# Patient Record
Sex: FEMALE | Race: WHITE | NOT HISPANIC OR LATINO | Employment: FULL TIME | URBAN - NONMETROPOLITAN AREA
[De-identification: names, ages, dates, MRNs, and addresses within clinical notes are randomized per-mention and may not be internally consistent; named-entity substitution may affect disease eponyms.]

---
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
## 2022-11-23 ENCOUNTER — HOSPITAL ENCOUNTER (OUTPATIENT)
Facility: CLINIC | Age: 22
Setting detail: OBSERVATION
Discharge: HOME | End: 2022-11-24
Attending: NURSE PRACTITIONER | Admitting: SURGERY
Payer: COMMERCIAL

## 2022-11-23 ENCOUNTER — APPOINTMENT (OUTPATIENT)
Dept: CT IMAGING | Facility: CLINIC | Age: 22
End: 2022-11-23
Attending: NURSE PRACTITIONER
Payer: COMMERCIAL

## 2022-11-23 DIAGNOSIS — K35.30 ACUTE APPENDICITIS WITH LOCALIZED PERITONITIS, WITHOUT PERFORATION, ABSCESS, OR GANGRENE: ICD-10-CM

## 2022-11-23 PROBLEM — B36.0 TINEA VERSICOLOR: Status: ACTIVE | Noted: 2022-10-20

## 2022-11-23 LAB
ALBUMIN SERPL-MCNC: 4.7 G/DL (ref 3.4–5)
ALBUMIN UR-MCNC: NEGATIVE MG/DL
ALP SERPL-CCNC: 59 U/L (ref 40–150)
ALT SERPL W P-5'-P-CCNC: 20 U/L (ref 0–50)
ANION GAP SERPL CALCULATED.3IONS-SCNC: 7 MMOL/L (ref 3–14)
APPEARANCE UR: CLEAR
AST SERPL W P-5'-P-CCNC: 12 U/L (ref 0–45)
BILIRUB SERPL-MCNC: 1.6 MG/DL (ref 0.2–1.3)
BILIRUB UR QL STRIP: NEGATIVE
BUN SERPL-MCNC: 8 MG/DL (ref 7–30)
CALCIUM SERPL-MCNC: 10.1 MG/DL (ref 8.5–10.1)
CHLORIDE BLD-SCNC: 102 MMOL/L (ref 94–109)
CO2 SERPL-SCNC: 27 MMOL/L (ref 20–32)
COLOR UR AUTO: ABNORMAL
CREAT SERPL-MCNC: 0.74 MG/DL (ref 0.52–1.04)
ERYTHROCYTE [DISTWIDTH] IN BLOOD BY AUTOMATED COUNT: 14.3 % (ref 10–15)
GFR SERPL CREATININE-BSD FRML MDRD: >90 ML/MIN/1.73M2
GLUCOSE BLD-MCNC: 92 MG/DL (ref 70–99)
GLUCOSE UR STRIP-MCNC: NEGATIVE MG/DL
HCG SERPL QL: NEGATIVE
HCT VFR BLD AUTO: 38.5 % (ref 35–47)
HGB BLD-MCNC: 13.6 G/DL (ref 11.7–15.7)
HGB UR QL STRIP: ABNORMAL
KETONES UR STRIP-MCNC: 20 MG/DL
LEUKOCYTE ESTERASE UR QL STRIP: NEGATIVE
MCH RBC QN AUTO: 31.1 PG (ref 26.5–33)
MCHC RBC AUTO-ENTMCNC: 35.3 G/DL (ref 31.5–36.5)
MCV RBC AUTO: 88 FL (ref 78–100)
NITRATE UR QL: NEGATIVE
PH UR STRIP: 6 [PH] (ref 5–7)
PLATELET # BLD AUTO: 191 10E3/UL (ref 150–450)
POTASSIUM BLD-SCNC: 3.6 MMOL/L (ref 3.4–5.3)
PROT SERPL-MCNC: 8.3 G/DL (ref 6.8–8.8)
RBC # BLD AUTO: 4.38 10E6/UL (ref 3.8–5.2)
RBC URINE: 0 /HPF
SODIUM SERPL-SCNC: 136 MMOL/L (ref 133–144)
SP GR UR STRIP: 1 (ref 1–1.03)
SQUAMOUS EPITHELIAL: <1 /HPF
UROBILINOGEN UR STRIP-MCNC: NORMAL MG/DL
WBC # BLD AUTO: 14 10E3/UL (ref 4–11)
WBC URINE: <1 /HPF

## 2022-11-23 PROCEDURE — 250N000009 HC RX 250: Performed by: NURSE PRACTITIONER

## 2022-11-23 PROCEDURE — 99285 EMERGENCY DEPT VISIT HI MDM: CPT | Mod: 25

## 2022-11-23 PROCEDURE — 96365 THER/PROPH/DIAG IV INF INIT: CPT

## 2022-11-23 PROCEDURE — 250N000011 HC RX IP 250 OP 636: Performed by: NURSE PRACTITIONER

## 2022-11-23 PROCEDURE — 84132 ASSAY OF SERUM POTASSIUM: CPT | Performed by: EMERGENCY MEDICINE

## 2022-11-23 PROCEDURE — 74177 CT ABD & PELVIS W/CONTRAST: CPT

## 2022-11-23 PROCEDURE — 36415 COLL VENOUS BLD VENIPUNCTURE: CPT | Performed by: EMERGENCY MEDICINE

## 2022-11-23 PROCEDURE — 82947 ASSAY GLUCOSE BLOOD QUANT: CPT | Performed by: EMERGENCY MEDICINE

## 2022-11-23 PROCEDURE — 85027 COMPLETE CBC AUTOMATED: CPT | Performed by: EMERGENCY MEDICINE

## 2022-11-23 PROCEDURE — 96375 TX/PRO/DX INJ NEW DRUG ADDON: CPT

## 2022-11-23 PROCEDURE — 84703 CHORIONIC GONADOTROPIN ASSAY: CPT | Performed by: EMERGENCY MEDICINE

## 2022-11-23 PROCEDURE — 96360 HYDRATION IV INFUSION INIT: CPT | Mod: 59

## 2022-11-23 PROCEDURE — 96361 HYDRATE IV INFUSION ADD-ON: CPT

## 2022-11-23 PROCEDURE — G0378 HOSPITAL OBSERVATION PER HR: HCPCS

## 2022-11-23 PROCEDURE — 258N000003 HC RX IP 258 OP 636: Performed by: NURSE PRACTITIONER

## 2022-11-23 PROCEDURE — 258N000003 HC RX IP 258 OP 636: Performed by: SURGERY

## 2022-11-23 PROCEDURE — C9803 HOPD COVID-19 SPEC COLLECT: HCPCS

## 2022-11-23 PROCEDURE — 250N000011 HC RX IP 250 OP 636: Performed by: SURGERY

## 2022-11-23 PROCEDURE — 81001 URINALYSIS AUTO W/SCOPE: CPT | Performed by: NURSE PRACTITIONER

## 2022-11-23 PROCEDURE — 96376 TX/PRO/DX INJ SAME DRUG ADON: CPT

## 2022-11-23 RX ORDER — HYDROMORPHONE HYDROCHLORIDE 1 MG/ML
.2-.5 INJECTION, SOLUTION INTRAMUSCULAR; INTRAVENOUS; SUBCUTANEOUS
Status: DISCONTINUED | OUTPATIENT
Start: 2022-11-23 | End: 2022-11-24 | Stop reason: HOSPADM

## 2022-11-23 RX ORDER — LIDOCAINE 40 MG/G
CREAM TOPICAL
Status: DISCONTINUED | OUTPATIENT
Start: 2022-11-23 | End: 2022-11-24 | Stop reason: HOSPADM

## 2022-11-23 RX ORDER — PROCHLORPERAZINE MALEATE 10 MG
10 TABLET ORAL EVERY 8 HOURS PRN
Status: DISCONTINUED | OUTPATIENT
Start: 2022-11-23 | End: 2022-11-24 | Stop reason: HOSPADM

## 2022-11-23 RX ORDER — HYDROMORPHONE HYDROCHLORIDE 1 MG/ML
0.5 INJECTION, SOLUTION INTRAMUSCULAR; INTRAVENOUS; SUBCUTANEOUS ONCE
Status: COMPLETED | OUTPATIENT
Start: 2022-11-23 | End: 2022-11-23

## 2022-11-23 RX ORDER — SODIUM CHLORIDE, SODIUM LACTATE, POTASSIUM CHLORIDE, CALCIUM CHLORIDE 600; 310; 30; 20 MG/100ML; MG/100ML; MG/100ML; MG/100ML
INJECTION, SOLUTION INTRAVENOUS CONTINUOUS
Status: DISCONTINUED | OUTPATIENT
Start: 2022-11-23 | End: 2022-11-24 | Stop reason: HOSPADM

## 2022-11-23 RX ORDER — ALBUTEROL SULFATE 90 UG/1
AEROSOL, METERED RESPIRATORY (INHALATION)
COMMUNITY
Start: 2022-09-15 | End: 2022-11-23

## 2022-11-23 RX ORDER — IOPAMIDOL 755 MG/ML
65 INJECTION, SOLUTION INTRAVASCULAR ONCE
Status: COMPLETED | OUTPATIENT
Start: 2022-11-23 | End: 2022-11-23

## 2022-11-23 RX ORDER — PIPERACILLIN SODIUM, TAZOBACTAM SODIUM 3; .375 G/15ML; G/15ML
3.38 INJECTION, POWDER, LYOPHILIZED, FOR SOLUTION INTRAVENOUS EVERY 6 HOURS
Status: DISCONTINUED | OUTPATIENT
Start: 2022-11-23 | End: 2022-11-24 | Stop reason: HOSPADM

## 2022-11-23 RX ORDER — ONDANSETRON 2 MG/ML
4 INJECTION INTRAMUSCULAR; INTRAVENOUS ONCE
Status: COMPLETED | OUTPATIENT
Start: 2022-11-23 | End: 2022-11-23

## 2022-11-23 RX ORDER — ONDANSETRON 2 MG/ML
4 INJECTION INTRAMUSCULAR; INTRAVENOUS EVERY 6 HOURS PRN
Status: DISCONTINUED | OUTPATIENT
Start: 2022-11-23 | End: 2022-11-24 | Stop reason: HOSPADM

## 2022-11-23 RX ORDER — ONDANSETRON 4 MG/1
4 TABLET, ORALLY DISINTEGRATING ORAL EVERY 6 HOURS PRN
Status: DISCONTINUED | OUTPATIENT
Start: 2022-11-23 | End: 2022-11-24 | Stop reason: HOSPADM

## 2022-11-23 RX ORDER — PIPERACILLIN SODIUM, TAZOBACTAM SODIUM 3; .375 G/15ML; G/15ML
3.38 INJECTION, POWDER, LYOPHILIZED, FOR SOLUTION INTRAVENOUS ONCE
Status: DISCONTINUED | OUTPATIENT
Start: 2022-11-23 | End: 2022-11-23

## 2022-11-23 RX ADMIN — HYDROMORPHONE HYDROCHLORIDE 0.5 MG: 1 INJECTION, SOLUTION INTRAMUSCULAR; INTRAVENOUS; SUBCUTANEOUS at 18:09

## 2022-11-23 RX ADMIN — SODIUM CHLORIDE, POTASSIUM CHLORIDE, SODIUM LACTATE AND CALCIUM CHLORIDE: 600; 310; 30; 20 INJECTION, SOLUTION INTRAVENOUS at 22:16

## 2022-11-23 RX ADMIN — SODIUM CHLORIDE 60 ML: 9 INJECTION, SOLUTION INTRAVENOUS at 17:16

## 2022-11-23 RX ADMIN — ONDANSETRON 4 MG: 2 INJECTION INTRAMUSCULAR; INTRAVENOUS at 18:08

## 2022-11-23 RX ADMIN — HYDROMORPHONE HYDROCHLORIDE 0.5 MG: 1 INJECTION, SOLUTION INTRAMUSCULAR; INTRAVENOUS; SUBCUTANEOUS at 21:00

## 2022-11-23 RX ADMIN — IOPAMIDOL 65 ML: 755 INJECTION, SOLUTION INTRAVENOUS at 17:16

## 2022-11-23 RX ADMIN — ONDANSETRON 4 MG: 2 INJECTION INTRAMUSCULAR; INTRAVENOUS at 18:12

## 2022-11-23 RX ADMIN — PROCHLORPERAZINE EDISYLATE 10 MG: 5 INJECTION INTRAMUSCULAR; INTRAVENOUS at 23:03

## 2022-11-23 RX ADMIN — PIPERACILLIN AND TAZOBACTAM 3.38 G: 3; .375 INJECTION, POWDER, FOR SOLUTION INTRAVENOUS at 18:26

## 2022-11-23 RX ADMIN — SODIUM CHLORIDE 1000 ML: 9 INJECTION, SOLUTION INTRAVENOUS at 17:01

## 2022-11-23 ASSESSMENT — ENCOUNTER SYMPTOMS
DYSURIA: 0
FEVER: 0
COUGH: 0
NAUSEA: 0
DIARRHEA: 1
CONFUSION: 0
VOMITING: 0
SHORTNESS OF BREATH: 0
HEADACHES: 0
BLOOD IN STOOL: 0
DIZZINESS: 0
ABDOMINAL PAIN: 1
MYALGIAS: 0
ARTHRALGIAS: 0
NECK STIFFNESS: 0
CONSTIPATION: 0
FATIGUE: 0
BACK PAIN: 1

## 2022-11-23 ASSESSMENT — ACTIVITIES OF DAILY LIVING (ADL)
ADLS_ACUITY_SCORE: 35
ADLS_ACUITY_SCORE: 35
ADLS_ACUITY_SCORE: 28
ADLS_ACUITY_SCORE: 35

## 2022-11-23 NOTE — ED TRIAGE NOTES
Abdominal pain since Sunday, felt similar to constipation but been moving bowels. Sent to ED after seen at  to rule out appendicitis.      Triage Assessment     Row Name 11/23/22 8443       Triage Assessment (Adult)    Airway WDL WDL       Skin Circulation/Temperature WDL    Skin Circulation/Temperature WDL WDL       Cardiac WDL    Cardiac WDL WDL       Peripheral/Neurovascular WDL    Peripheral Neurovascular WDL WDL       Cognitive/Neuro/Behavioral WDL    Cognitive/Neuro/Behavioral WDL WDL

## 2022-11-23 NOTE — LETTER
[facility] OR  [address] [place] [address]  [phone]          November 24, 2022    RE:  Meliza Lala                                                                                                                                                       [address]  [facility] [address]            To whom it may concern:    Meliza Lala is under my professional care following surgery on 11/24/2022. She will be unable to work for two weeks after surgery. She should not lift >15lbs for 4 weeks after surgery. Please call with any questions or concerns.     Sincerely,        Kendra Hadley MD  [facility]  [phone]

## 2022-11-23 NOTE — ED PROVIDER NOTES
History     Chief Complaint:  Abdominal Pain       HPI   Meliza Lala is a 21 year old female, with history of IBS, who presents with abdominal pain.  Patient notes onset of lower abdominal pain right greater than left 3 days ago.  She has associated bloating and decreased appetite.  Patient states she feels as if she is constipated but is having diarrhea 2 times a day.  She denies emesis, hematochezia, melena.  The patient denies dysuria.  She has associated low back pain.  She denies pelvic pain, vaginal discharge.  She last ate a protein shake at 9 AM.  Of note, she was diagnosed with Covid 19 ten days ago and is no longer febrile and has no cough.  Of note, the patient had a colonoscopy and endoscopy 1 year ago in [place] and was diagnosed with irritable bowel syndrome at that time.  She is not on any medications for IBS.    ROS:  Review of Systems   Constitutional: Negative for fatigue and fever.   HENT: Negative for congestion.    Respiratory: Negative for cough and shortness of breath.    Cardiovascular: Negative for chest pain.   Gastrointestinal: Positive for abdominal pain and diarrhea. Negative for blood in stool, constipation, nausea and vomiting.   Genitourinary: Negative for dysuria, pelvic pain and vaginal discharge.   Musculoskeletal: Positive for back pain. Negative for arthralgias, myalgias and neck stiffness.   Skin: Negative for rash.   Neurological: Negative for dizziness, syncope and headaches.   Psychiatric/Behavioral: Negative for confusion.   All other systems reviewed and are negative.    Allergies:  No Known Allergies     Medications:    No current outpatient medications on file.    Past Medical History:    No past medical history on file.    Past Surgical History:    No past surgical history on file.     Family History:    family history includes Other - See Comments in her mother.    Social History:     PCP: No Ref-Primary, Physician     Physical Exam     Patient Vitals for the past  "24 hrs:   BP Temp Temp src Pulse Resp SpO2 Height Weight   11/23/22 1433 121/77 97.9  F (36.6  C) Temporal 89 16 99 % 1.6 m (5' 3\") 59 kg (130 lb)        Physical Exam  Nursing notes reviewed. Vitals reviewed.  General: Alert. Well kept.  Eyes:  Conjunctiva non-injected, non-icteric.  Neck/Throat: Moist mucous membranes.  Normal voice.  Cardiac: Regular rhythm. Normal heart sounds with no murmur/rubs/click.   Pulmonary: Clear and equal breath sounds bilaterally. No crackles/rales. No wheezing  Abdomen: Soft. Non-distended.  Bilateral lower abdominal pain right greater than left.  Mild right CVA tenderness.  No masses. No guarding or rebound.  Musculoskeletal: Normal gross range of motion of all 4 extremities.    Neurological: Alert and oriented x4.   Skin: Warm and dry without rashes or petechiae. Normal appearance of visualized exposed skin.  Psych: Affect normal. Good eye contact.      Emergency Department Course   Imaging:  CT Abdomen Pelvis w Contrast   Preliminary Result   IMPRESSION: Severe acute appendicitis. Prominent enlargement of the appendix measuring 2.3 cm with inflammatory edema noted. No abscess at this time.       Report per radiology    Laboratory:  Labs Ordered and Resulted from Time of ED Arrival to Time of ED Departure   CBC WITH PLATELETS - Abnormal       Result Value    WBC Count 14.0 (*)     RBC Count 4.38      Hemoglobin 13.6      Hematocrit 38.5      MCV 88      MCH 31.1      MCHC 35.3      RDW 14.3      Platelet Count 191     COMPREHENSIVE METABOLIC PANEL - Abnormal    Sodium 136      Potassium 3.6      Chloride 102      Carbon Dioxide (CO2) 27      Anion Gap 7      Urea Nitrogen 8      Creatinine 0.74      Calcium 10.1      Glucose 92      Alkaline Phosphatase 59      AST 12      ALT 20      Protein Total 8.3      Albumin 4.7      Bilirubin Total 1.6 (*)     GFR Estimate >90     ROUTINE UA WITH MICROSCOPIC REFLEX TO CULTURE - Abnormal    Color Urine Straw      Appearance Urine Clear      " Glucose Urine Negative      Bilirubin Urine Negative      Ketones Urine 20 (*)     Specific Gravity Urine 1.005      Blood Urine Small (*)     pH Urine 6.0      Protein Albumin Urine Negative      Urobilinogen Urine Normal      Nitrite Urine Negative      Leukocyte Esterase Urine Negative      RBC Urine 0      WBC Urine <1      Squamous Epithelials Urine <1     HCG QUALITATIVE PREGNANCY - Normal    hCG Serum Qualitative Negative     COVID-19 VIRUS (CORONAVIRUS) BY PCR     Emergency Department Course:  Reviewed:  I reviewed nursing notes, vitals, past medical history and Care Everywhere    Assessments:  1633 I obtained history and examined the patient as noted above.   1745 I rechecked the patient and explained findings.     Consults:   1750 I spoke with Dr. Aguilar, general surgery, who accepts patient for admission.    Interventions:  Medications   0.9% sodium chloride BOLUS (1,000 mLs Intravenous New Bag 11/23/22 1701)   HYDROmorphone (PF) (DILAUDID) injection 0.5 mg (has no administration in time range)   piperacillin-tazobactam (ZOSYN) 3.375 g vial to attach to  mL bag (has no administration in time range)   iopamidol (ISOVUE-370) solution 65 mL (65 mLs Intravenous Given 11/23/22 1716)   Saline Flush (60 mLs Intravenous Given 11/23/22 1716)      Disposition:  The patient was admitted to the hospital under the care of Dr. Negrito Aguilar.     Impression & Plan    Medical Decision Making:  Meliza Lala is a 21 year old female who presents with abdominal pain concerning for appendicitis. CT scan confirms the presence of appendicitis, and there is no evidence of rupture or abscess at this time. The patient s symptoms have been controlled with interventions in the Emergency Department, and she appears more comfortable on recheck. I discussed the diagnosis with the patient and have answered all questions about the plan of care. Zosyn antibiotics have been ordered .  I discussed the patient with the on call  general surgeon, Dr. Negrito Aguilar, and the patient will be admitted to observation for surgery in the morning. she has remained hemodynamically stable in the Emergency Department.    Diagnosis:    ICD-10-CM    1. Acute appendicitis with localized peritonitis, without perforation, abscess, or gangrene  K35.30          11/23/2022   Jory Mariano, Jory Abdalla, CNP  11/23/22 1753

## 2022-11-24 ENCOUNTER — ANESTHESIA EVENT (OUTPATIENT)
Dept: SURGERY | Facility: CLINIC | Age: 22
End: 2022-11-24
Payer: COMMERCIAL

## 2022-11-24 ENCOUNTER — ANESTHESIA (OUTPATIENT)
Dept: SURGERY | Facility: CLINIC | Age: 22
End: 2022-11-24
Payer: COMMERCIAL

## 2022-11-24 VITALS
HEART RATE: 70 BPM | RESPIRATION RATE: 18 BRPM | HEIGHT: 63 IN | WEIGHT: 130 LBS | SYSTOLIC BLOOD PRESSURE: 128 MMHG | DIASTOLIC BLOOD PRESSURE: 86 MMHG | BODY MASS INDEX: 23.04 KG/M2 | TEMPERATURE: 97.6 F | OXYGEN SATURATION: 98 %

## 2022-11-24 LAB — GLUCOSE BLDC GLUCOMTR-MCNC: 79 MG/DL (ref 70–99)

## 2022-11-24 PROCEDURE — 82962 GLUCOSE BLOOD TEST: CPT

## 2022-11-24 PROCEDURE — 250N000009 HC RX 250: Performed by: NURSE ANESTHETIST, CERTIFIED REGISTERED

## 2022-11-24 PROCEDURE — 258N000001 HC RX 258: Performed by: SURGERY

## 2022-11-24 PROCEDURE — 250N000009 HC RX 250: Performed by: SURGERY

## 2022-11-24 PROCEDURE — 88304 TISSUE EXAM BY PATHOLOGIST: CPT | Mod: 26 | Performed by: PATHOLOGY

## 2022-11-24 PROCEDURE — 250N000011 HC RX IP 250 OP 636: Performed by: NURSE ANESTHETIST, CERTIFIED REGISTERED

## 2022-11-24 PROCEDURE — 88304 TISSUE EXAM BY PATHOLOGIST: CPT | Mod: TC | Performed by: SURGERY

## 2022-11-24 PROCEDURE — 44970 LAPAROSCOPY APPENDECTOMY: CPT | Mod: AS | Performed by: PHYSICIAN ASSISTANT

## 2022-11-24 PROCEDURE — 360N000076 HC SURGERY LEVEL 3, PER MIN: Performed by: SURGERY

## 2022-11-24 PROCEDURE — 710N000009 HC RECOVERY PHASE 1, LEVEL 1, PER MIN: Performed by: SURGERY

## 2022-11-24 PROCEDURE — 710N000012 HC RECOVERY PHASE 2, PER MINUTE: Performed by: SURGERY

## 2022-11-24 PROCEDURE — 250N000011 HC RX IP 250 OP 636: Performed by: ANESTHESIOLOGY

## 2022-11-24 PROCEDURE — 250N000013 HC RX MED GY IP 250 OP 250 PS 637: Performed by: SURGERY

## 2022-11-24 PROCEDURE — 250N000011 HC RX IP 250 OP 636: Performed by: SURGERY

## 2022-11-24 PROCEDURE — 272N000001 HC OR GENERAL SUPPLY STERILE: Performed by: SURGERY

## 2022-11-24 PROCEDURE — 250N000025 HC SEVOFLURANE, PER MIN: Performed by: SURGERY

## 2022-11-24 PROCEDURE — 96366 THER/PROPH/DIAG IV INF ADDON: CPT | Mod: 59

## 2022-11-24 PROCEDURE — G0378 HOSPITAL OBSERVATION PER HR: HCPCS

## 2022-11-24 PROCEDURE — 370N000017 HC ANESTHESIA TECHNICAL FEE, PER MIN: Performed by: SURGERY

## 2022-11-24 PROCEDURE — 44970 LAPAROSCOPY APPENDECTOMY: CPT | Performed by: SURGERY

## 2022-11-24 PROCEDURE — 250N000013 HC RX MED GY IP 250 OP 250 PS 637: Performed by: PHYSICIAN ASSISTANT

## 2022-11-24 PROCEDURE — 999N000141 HC STATISTIC PRE-PROCEDURE NURSING ASSESSMENT: Performed by: SURGERY

## 2022-11-24 RX ORDER — FENTANYL CITRATE 50 UG/ML
INJECTION, SOLUTION INTRAMUSCULAR; INTRAVENOUS PRN
Status: DISCONTINUED | OUTPATIENT
Start: 2022-11-24 | End: 2022-11-24

## 2022-11-24 RX ORDER — HYDROCODONE BITARTRATE AND ACETAMINOPHEN 5; 325 MG/1; MG/1
1-2 TABLET ORAL EVERY 4 HOURS PRN
Qty: 15 TABLET | Refills: 0 | Status: SHIPPED | OUTPATIENT
Start: 2022-11-24

## 2022-11-24 RX ORDER — ONDANSETRON 2 MG/ML
4 INJECTION INTRAMUSCULAR; INTRAVENOUS EVERY 30 MIN PRN
Status: DISCONTINUED | OUTPATIENT
Start: 2022-11-24 | End: 2022-11-24 | Stop reason: HOSPADM

## 2022-11-24 RX ORDER — FENTANYL CITRATE 50 UG/ML
50 INJECTION, SOLUTION INTRAMUSCULAR; INTRAVENOUS EVERY 5 MIN PRN
Status: DISCONTINUED | OUTPATIENT
Start: 2022-11-24 | End: 2022-11-24 | Stop reason: HOSPADM

## 2022-11-24 RX ORDER — KETOROLAC TROMETHAMINE 30 MG/ML
INJECTION, SOLUTION INTRAMUSCULAR; INTRAVENOUS PRN
Status: DISCONTINUED | OUTPATIENT
Start: 2022-11-24 | End: 2022-11-24

## 2022-11-24 RX ORDER — HYDROMORPHONE HCL IN WATER/PF 6 MG/30 ML
0.4 PATIENT CONTROLLED ANALGESIA SYRINGE INTRAVENOUS EVERY 5 MIN PRN
Status: DISCONTINUED | OUTPATIENT
Start: 2022-11-24 | End: 2022-11-24 | Stop reason: HOSPADM

## 2022-11-24 RX ORDER — MAGNESIUM HYDROXIDE 1200 MG/15ML
LIQUID ORAL PRN
Status: DISCONTINUED | OUTPATIENT
Start: 2022-11-24 | End: 2022-11-24 | Stop reason: HOSPADM

## 2022-11-24 RX ORDER — DEXAMETHASONE SODIUM PHOSPHATE 4 MG/ML
INJECTION, SOLUTION INTRA-ARTICULAR; INTRALESIONAL; INTRAMUSCULAR; INTRAVENOUS; SOFT TISSUE PRN
Status: DISCONTINUED | OUTPATIENT
Start: 2022-11-24 | End: 2022-11-24

## 2022-11-24 RX ORDER — OXYCODONE HYDROCHLORIDE 5 MG/1
5 TABLET ORAL
Status: COMPLETED | OUTPATIENT
Start: 2022-11-24 | End: 2022-11-24

## 2022-11-24 RX ORDER — PROPOFOL 10 MG/ML
INJECTION, EMULSION INTRAVENOUS CONTINUOUS PRN
Status: DISCONTINUED | OUTPATIENT
Start: 2022-11-24 | End: 2022-11-24

## 2022-11-24 RX ORDER — SODIUM CHLORIDE, SODIUM LACTATE, POTASSIUM CHLORIDE, CALCIUM CHLORIDE 600; 310; 30; 20 MG/100ML; MG/100ML; MG/100ML; MG/100ML
INJECTION, SOLUTION INTRAVENOUS CONTINUOUS
Status: DISCONTINUED | OUTPATIENT
Start: 2022-11-24 | End: 2022-11-24 | Stop reason: HOSPADM

## 2022-11-24 RX ORDER — HYDROMORPHONE HCL IN WATER/PF 6 MG/30 ML
0.2 PATIENT CONTROLLED ANALGESIA SYRINGE INTRAVENOUS EVERY 5 MIN PRN
Status: DISCONTINUED | OUTPATIENT
Start: 2022-11-24 | End: 2022-11-24 | Stop reason: HOSPADM

## 2022-11-24 RX ORDER — ONDANSETRON 4 MG/1
4 TABLET, ORALLY DISINTEGRATING ORAL EVERY 30 MIN PRN
Status: DISCONTINUED | OUTPATIENT
Start: 2022-11-24 | End: 2022-11-24 | Stop reason: HOSPADM

## 2022-11-24 RX ORDER — GLYCOPYRROLATE 0.2 MG/ML
INJECTION, SOLUTION INTRAMUSCULAR; INTRAVENOUS PRN
Status: DISCONTINUED | OUTPATIENT
Start: 2022-11-24 | End: 2022-11-24

## 2022-11-24 RX ORDER — AMOXICILLIN 250 MG
1-2 CAPSULE ORAL 2 TIMES DAILY PRN
Qty: 20 TABLET | Refills: 0 | Status: SHIPPED | OUTPATIENT
Start: 2022-11-24

## 2022-11-24 RX ORDER — FENTANYL CITRATE 50 UG/ML
25 INJECTION, SOLUTION INTRAMUSCULAR; INTRAVENOUS EVERY 5 MIN PRN
Status: DISCONTINUED | OUTPATIENT
Start: 2022-11-24 | End: 2022-11-24 | Stop reason: HOSPADM

## 2022-11-24 RX ORDER — LIDOCAINE HYDROCHLORIDE 20 MG/ML
INJECTION, SOLUTION INFILTRATION; PERINEURAL PRN
Status: DISCONTINUED | OUTPATIENT
Start: 2022-11-24 | End: 2022-11-24

## 2022-11-24 RX ORDER — NEOSTIGMINE METHYLSULFATE 1 MG/ML
VIAL (ML) INJECTION PRN
Status: DISCONTINUED | OUTPATIENT
Start: 2022-11-24 | End: 2022-11-24

## 2022-11-24 RX ORDER — ONDANSETRON 2 MG/ML
INJECTION INTRAMUSCULAR; INTRAVENOUS PRN
Status: DISCONTINUED | OUTPATIENT
Start: 2022-11-24 | End: 2022-11-24

## 2022-11-24 RX ORDER — PROPOFOL 10 MG/ML
INJECTION, EMULSION INTRAVENOUS PRN
Status: DISCONTINUED | OUTPATIENT
Start: 2022-11-24 | End: 2022-11-24

## 2022-11-24 RX ADMIN — ONDANSETRON 4 MG: 2 INJECTION INTRAMUSCULAR; INTRAVENOUS at 08:36

## 2022-11-24 RX ADMIN — GLYCOPYRROLATE 0.4 MG: 0.2 INJECTION, SOLUTION INTRAMUSCULAR; INTRAVENOUS at 08:56

## 2022-11-24 RX ADMIN — FENTANYL CITRATE 25 MCG: 50 INJECTION, SOLUTION INTRAMUSCULAR; INTRAVENOUS at 09:40

## 2022-11-24 RX ADMIN — FENTANYL CITRATE 50 MCG: 50 INJECTION, SOLUTION INTRAMUSCULAR; INTRAVENOUS at 09:27

## 2022-11-24 RX ADMIN — KETOROLAC TROMETHAMINE 15 MG: 30 INJECTION, SOLUTION INTRAMUSCULAR at 09:02

## 2022-11-24 RX ADMIN — PIPERACILLIN AND TAZOBACTAM 3.38 G: 3; .375 INJECTION, POWDER, FOR SOLUTION INTRAVENOUS at 00:45

## 2022-11-24 RX ADMIN — FENTANYL CITRATE 25 MCG: 50 INJECTION, SOLUTION INTRAMUSCULAR; INTRAVENOUS at 09:34

## 2022-11-24 RX ADMIN — LIDOCAINE HYDROCHLORIDE 100 MG: 20 INJECTION, SOLUTION INFILTRATION; PERINEURAL at 08:13

## 2022-11-24 RX ADMIN — ROCURONIUM BROMIDE 30 MG: 50 INJECTION, SOLUTION INTRAVENOUS at 08:13

## 2022-11-24 RX ADMIN — FENTANYL CITRATE 100 MCG: 50 INJECTION, SOLUTION INTRAMUSCULAR; INTRAVENOUS at 08:13

## 2022-11-24 RX ADMIN — ONDANSETRON 4 MG: 2 INJECTION INTRAMUSCULAR; INTRAVENOUS at 09:25

## 2022-11-24 RX ADMIN — PROPOFOL 200 MG: 10 INJECTION, EMULSION INTRAVENOUS at 08:15

## 2022-11-24 RX ADMIN — Medication 1 MG: at 02:34

## 2022-11-24 RX ADMIN — PROPOFOL 30 MCG/KG/MIN: 10 INJECTION, EMULSION INTRAVENOUS at 08:18

## 2022-11-24 RX ADMIN — PIPERACILLIN AND TAZOBACTAM 3.38 G: 3; .375 INJECTION, POWDER, FOR SOLUTION INTRAVENOUS at 06:20

## 2022-11-24 RX ADMIN — NEOSTIGMINE METHYLSULFATE 3 MG: 1 INJECTION, SOLUTION INTRAVENOUS at 08:56

## 2022-11-24 RX ADMIN — OXYCODONE HYDROCHLORIDE 5 MG: 5 TABLET ORAL at 09:53

## 2022-11-24 RX ADMIN — DEXAMETHASONE SODIUM PHOSPHATE 4 MG: 4 INJECTION, SOLUTION INTRA-ARTICULAR; INTRALESIONAL; INTRAMUSCULAR; INTRAVENOUS; SOFT TISSUE at 08:26

## 2022-11-24 RX ADMIN — MIDAZOLAM 2 MG: 1 INJECTION INTRAMUSCULAR; INTRAVENOUS at 08:03

## 2022-11-24 ASSESSMENT — ACTIVITIES OF DAILY LIVING (ADL)
ADLS_ACUITY_SCORE: 28

## 2022-11-24 NOTE — PROGRESS NOTES
Surgery    Patient may be discharged from hospital when meeting criteria.    Mario Anderson PA-C

## 2022-11-24 NOTE — ANESTHESIA CARE TRANSFER NOTE
Patient: Meliza Lala    Procedure: Procedure(s):  APPENDECTOMY, LAPAROSCOPIC       Diagnosis: Acute appendicitis with localized peritonitis, without perforation, abscess, or gangrene [K35.30]  Diagnosis Additional Information: No value filed.    Anesthesia Type:   General     Note:    Oropharynx: oropharynx clear of all foreign objects  Level of Consciousness: awake  Oxygen Supplementation: face mask  Level of Supplemental Oxygen (L/min / FiO2): 6  Independent Airway: airway patency satisfactory and stable  Dentition: dentition unchanged      Patient transferred to: PACU    Handoff Report: Identifed the Patient, Identified the Reponsible Provider, Reviewed the pertinent medical history, Discussed the surgical course, Reviewed Intra-OP anesthesia mangement and issues during anesthesia, Set expectations for post-procedure period and Allowed opportunity for questions and acknowledgement of understanding      Vitals:  Vitals Value Taken Time   BP     Temp     Pulse 104 11/24/22 0917   Resp 28 11/24/22 0917   SpO2 100 % 11/24/22 0917   Vitals shown include unvalidated device data.    Electronically Signed By: PAULA Saleh CRNA  November 24, 2022  9:20 AM

## 2022-11-24 NOTE — PHARMACY-ADMISSION MEDICATION HISTORY
Pharmacy Medication History  Admission medication history interview status for the 11/23/2022  admission is complete. See EPIC admission navigator for prior to admission medications     Location of Interview: Patient room  Medication history sources: Patient, Surescripts and Care Everywhere    Significant changes made to the medication list:  Removed albuterol inhaler    Additional medication history information:   Patient reports no medications on a regular basis.     Medication reconciliation completed by provider prior to medication history? No    Time spent in this activity: 5 min     Prior to Admission medications    Not on File     The information provided in this note is only as accurate as the sources available at the time of update(s)     Kasey Scherer, BradD, BCPS

## 2022-11-24 NOTE — DISCHARGE SUMMARY
Surgery Discharge Summary    Meliza Lala MRN# [id number]   YOB: 2000 Age: 21 year old     Date of Admission:  11/23/2022  Date of Discharge:  11/24/2022  Admitting Physician:  Negrito Aguilar MD  [facility]  [facility]   Primary Provider: No Ref-Primary, Physician    Discharge Diagnosis:   Principle Diagnosis:  Acute appendicitis with localized peritonitis, without perforation, abscess, or gangrene [K35.30]     Hospital Course: Meliza Lala was admitted to [facility] on 11/23/22. She underwent a laparoscopic appendectomy on 11/24. Intra-op findings were consistent with grossly inflamed but nonperforated acute appendicitis.  Her hospital course remained uncomplicated and she recovered as anticipated.  Following surgery, she was discharged when she was tolerating an oral diet, had good pain control on oral medications, was ambulating independently     Inpatient Consultations: No consultations were requested during this admission    Procedures:   Laparoscopic appendectomy      Labs/Imaging:   Results for orders placed or performed during the hospital encounter of 11/23/22 (from the past 24 hour(s))   CBC (+ platelets, no diff)   Result Value Ref Range    WBC Count 14.0 (H) 4.0 - 11.0 10e3/uL    RBC Count 4.38 3.80 - 5.20 10e6/uL    Hemoglobin 13.6 11.7 - 15.7 g/dL    Hematocrit 38.5 35.0 - 47.0 %    MCV 88 78 - 100 fL    MCH 31.1 26.5 - 33.0 pg    MCHC 35.3 31.5 - 36.5 g/dL    RDW 14.3 10.0 - 15.0 %    Platelet Count 191 150 - 450 10e3/uL   Comprehensive metabolic panel   Result Value Ref Range    Sodium 136 133 - 144 mmol/L    Potassium 3.6 3.4 - 5.3 mmol/L    Chloride 102 94 - 109 mmol/L    Carbon Dioxide (CO2) 27 20 - 32 mmol/L    Anion Gap 7 3 - 14 mmol/L    Urea Nitrogen 8 7 - 30 mg/dL    Creatinine 0.74 0.52 - 1.04 mg/dL    Calcium 10.1 8.5 - 10.1 mg/dL    Glucose 92 70 - 99 mg/dL    Alkaline Phosphatase 59 40 - 150 U/L    AST 12 0 - 45 U/L    ALT 20 0 - 50  U/L    Protein Total 8.3 6.8 - 8.8 g/dL    Albumin 4.7 3.4 - 5.0 g/dL    Bilirubin Total 1.6 (H) 0.2 - 1.3 mg/dL    GFR Estimate >90 >60 mL/min/1.73m2   HCG QUALitative pregnancy (blood)   Result Value Ref Range    hCG Serum Qualitative Negative Negative   UA with Microscopic reflex to Culture    Specimen: Urine, Midstream   Result Value Ref Range    Color Urine Straw Colorless, Straw, Light Yellow, Yellow    Appearance Urine Clear Clear    Glucose Urine Negative Negative mg/dL    Bilirubin Urine Negative Negative    Ketones Urine 20 (A) Negative mg/dL    Specific Gravity Urine 1.005 1.003 - 1.035    Blood Urine Small (A) Negative    pH Urine 6.0 5.0 - 7.0    Protein Albumin Urine Negative Negative mg/dL    Urobilinogen Urine Normal Normal, 2.0 mg/dL    Nitrite Urine Negative Negative    Leukocyte Esterase Urine Negative Negative    RBC Urine 0 <=2 /HPF    WBC Urine <1 <=5 /HPF    Squamous Epithelials Urine <1 <=1 /HPF    Narrative    Urine Culture not indicated   CT Abdomen Pelvis w Contrast    Narrative    EXAM: CT ABDOMEN PELVIS W CONTRAST  LOCATION: [facility]  DATE/TIME: 11/23/2022 5:26 PM    INDICATION: RLQ pain with diarrhea.  COMPARISON: None.  TECHNIQUE: CT scan of the abdomen and pelvis was performed following injection of IV contrast. Multiplanar reformats were obtained. Dose reduction techniques were used.  CONTRAST: 65 mL Isovue-370 IV.    FINDINGS:   LOWER CHEST: Normal.    HEPATOBILIARY: Normal.    PANCREAS: Normal.    SPLEEN: Normal.    ADRENAL GLANDS: Normal.    KIDNEYS/BLADDER: Normal.    BOWEL: Severe acute appendicitis evidenced by significant enlargement and inflammatory change of the appendix extending medial to the cecum. The transverse size of the appendix measures up to 2.3 cm on series 2, image 141. Calcified appendicolith noted.   There is adjacent edema. No drainable abscess can be seen at this time. There are several enlarged lymph nodes leading to the right  "lower quadrant that are likely reactive. No small bowel obstruction. No free air.    LYMPH NODES: Enlarged lymph nodes at the right lower quadrant mesentery likely related to appendicitis.    VASCULATURE: Unremarkable.    PELVIC ORGANS: Normal.    MUSCULOSKELETAL: Normal.      Impression    IMPRESSION: Severe acute appendicitis. Prominent enlargement of the appendix measuring 2.3 cm with inflammatory edema noted. No abscess at this time.   Glucose by meter   Result Value Ref Range    GLUCOSE BY METER POCT 79 70 - 99 mg/dL       Disposition:   Discharged to home with family     Discharge Condition  Discharge condition: Stable   Discharge vitals: Blood pressure 124/62, pulse 104, temperature 97.8  F (36.6  C), temperature source Temporal, resp. rate 18, height 1.6 m (5' 3\"), weight 59 kg (130 lb), SpO2 100 %.     Discharge Medications:   Current Discharge Medication List      START taking these medications    Details   HYDROcodone-acetaminophen (NORCO) 5-325 MG tablet Take 1-2 tablets by mouth every 4 hours as needed for moderate to severe pain  Qty: 15 tablet, Refills: 0    Associated Diagnoses: Acute appendicitis with localized peritonitis, without perforation, abscess, or gangrene      senna-docusate (SENOKOT-S/PERICOLACE) 8.6-50 MG tablet Take 1-2 tablets by mouth 2 times daily as needed for constipation (While on oral opioids.)  Qty: 20 tablet, Refills: 0    Associated Diagnoses: Acute appendicitis with localized peritonitis, without perforation, abscess, or gangrene             Discharge Instructions:     After Care Instructions     Diet Instructions      Follow your surgeon's orders for any diet restrictions.  If you did not receive any diet restrictions, you may drink clear liquids (apple juice, ginger ale, 7-up, broth, etc.), and progress to your regular diet as you feel able. It is important to stay well-hydrated after surgery and drink plenty of water.         Discharge Instructions - Comfort and Pain " Management      Pain after surgery is normal and expected. You will have some amount of pain after surgery. Your pain will improve with time. There are several things you can do to help reduce your pain including: rest, ice, and using pain medications as needed. Use pain interventions and don't wait until pain level is out of control. Contact your Surgeon Team if you have pain that persists or worsens after surgery despite rest, ice, and taking your medication(s) as prescribed. You may have a dry mouth, a sore throat, muscles aches or trouble sleeping, and these symptoms should go away after 24 hours.         No Alcohol      Do NOT drink alcoholic beverages for 24 hours following your surgery and while taking pain medications.         No driving or operating machinery      Do NOT drive any vehicle or operate mechanical equipment for 24 hours following the end of your surgery.  Even though you may feel normal, your reactions may be affected by Anesthesia medication you received.         Symptoms - Fever Management      A low grade fever can be expected after surgery. Your Provider many have prescribed an Opioid pain medication that also contains acetaminophen (TYLENOL) that may help with Fever management.  Do NOT take additional acetaminophen (TYLENOL) in combination with an Opioid/acetaminophen (TYLENOL) product. Read the labels on your Over The Counter (OTC) medications with care.         Symptoms - Reduced Urine Output      If it has been greater than 8 hours since you have urinated despite drinking plenty of water, call your Surgeon Team.         When to call - Contact Surgeon Team      You may experience symptoms that require follow-up before your scheduled appointment. Contact your Surgeon Team if you are concerned about pain control, large amount of bleeding, blood clots, constipation, or if you experience signs of infection (fever, growing tenderness at the surgery site, a large amount of drainage, severe  pain, foul-smelling drainage, redness or swelling.         When to call - Reach out to Urgent Care      If you are experiencing uncontrolled Nausea and Vomiting, uncontrolled pain, inability to urinate and uncomfortable, and in need of immediate care, and you are NOT able to reach your Surgeon Team, go to an Urgent Care clinic. Do NOT go to the Emergency Room unless you have shortness of breath, chest pain, or other signs of a medical emergency.         When to call - Reasons to Call 911      Call 911 immediately if you experience sudden-onset chest pain, arm weakness/numbness, slurred speech, or shortness of breath                   Mario Anderson PA-C   Office: [phone]

## 2022-11-24 NOTE — OP NOTE
PREOPERATIVE DIAGNOSIS: Acute appendicitis.      POSTOPERATIVE DIAGNOSIS: Acute appendicitis.      PROCEDURE: Laparoscopic appendectomy.      SURGEON: Kendra Hadley MD     ASSISTANT:  Mario Anderson PA-C  The physician s assistant was medically necessary for their expertise in camera management, suctioning and retraction.    ANESTHESIA: GET.     COMPLICATIONS: None.     BLOOD LOSS: Minimal.     FINDINGS: Acute appendicitis, no perforation.     INDICATIONS:Meliza Lala is a 21 year old  with three day history of abdominal pain. An abdominal CT was performed which showed acute appendicitis. I explained the risks, benefits, complications including but not limited to bleeding, infection, possible need to open, possible postop hematoma, seroma, bowel or bladder injury, all which require additional procedures. The patient did agree and did sign consent.       DETAILS OF PROCEDURE: The patient was brought to the operating room per anesthesia, placed in supine position, intubated without difficulty. They were given perioperative antibiotics.  The patient was prepped and draped in the usual fashion using ChloraPrep and the surgical timeout was then performed, verifying the correct surgeon, site, procedure and patient. All in the room were in agreement.    A 5mm 0 degree laparoscope was inserted and the visiport used to obtain entrance to the abdomen. Insufflation was connected and the abdomen insufflated. Initial evaluation of the abdomen revealed moderate inflammation in the right lower quadrant and revealed no trocar injuries. The abdomen was insufflated with pressure of 15, which the patient tolerated well, a 2nd 5mm port was placed suprapubically and a 12mm port placed infraumbilically under direct visualization. The appendix was found and appeared very dilated and hyperemic. It was minimally adherent to the lateral wall. The base of the cecum was minimally  thickened. The terminal ileum was normal. There was no  perforation. The ligasure device was used to divide the mesoappendix.  There was a large visible appendiceal artery identified in the mesoappendix which was clipped with 10mm clip applier. We continued dissected along the appendix to the base of the appendix. An Endo-ZOLTAN blue load was then fired across the base of the appendix  without issues. This required two staple loads and removed a small portion of the cecum along with the appendix as the base was very distended. The staple line was inspected and found oozing from the medial aspect which was controlled with several 10mm clips with excellent hemostasis.  The appendix was placed an EndoCatch bag and removed through the umbilical trocar. The area was explored. Staple lines were completely intact. There was no bleeding. The right lower quadrant was irrigated with saline and suctioned.The pelvis showed no acute findings. All trocars were taken out under direct visualization. The fascia was closed with an 0 vicryl sutures using the soha mynor device in a figure of eight fashion.     Marcaine 0.5% injected to all the wounds. They were irrigated and closed with 4-0 Monocryl in subcuticular fashion.Steri strips and bandaids were applied. The patient tolerated the procedure well and was awoken from anesthesia and transferred to PACU in stable condition. All sponge, instrument, and needle counts were correct at the conclusion of the procedure.      Kendra Hadley MD  [facility]  [phone]

## 2022-11-24 NOTE — PROGRESS NOTES
Spoke with Dr Aguilar, no need for Covid isolation or Covid testing as patient was positive more then 10 days ago and is asymptomatic. Considered Covid recovered.

## 2022-11-24 NOTE — ANESTHESIA PREPROCEDURE EVALUATION
Anesthesia Pre-Procedure Evaluation    Patient: Meliza Lala   MRN: [id number] : 2000        Procedure : Procedure(s):  APPENDECTOMY, LAPAROSCOPIC          No past medical history on file.   No past surgical history on file.   No Known Allergies   Social History     Tobacco Use     Smoking status: Not on file     Smokeless tobacco: Not on file   Substance Use Topics     Alcohol use: Not on file      Wt Readings from Last 1 Encounters:   22 59 kg (130 lb)        Anesthesia Evaluation   Pt has had prior anesthetic.     No history of anesthetic complications       ROS/MED HX  ENT/Pulmonary:  - neg pulmonary ROS     Neurologic:  - neg neurologic ROS     Cardiovascular:  - neg cardiovascular ROS     METS/Exercise Tolerance:     Hematologic:  - neg hematologic  ROS     Musculoskeletal:  - neg musculoskeletal ROS     GI/Hepatic: Comment: Patient had a colonoscopy and endoscopy 1 year ago in [place] and was diagnosed with irritable bowel syndrome      Renal/Genitourinary:  - neg Renal ROS     Endo:  - neg endo ROS     Psychiatric/Substance Use:  - neg psychiatric ROS     Infectious Disease:  - neg infectious disease ROS     Malignancy:  - neg malignancy ROS     Other:            Physical Exam    Airway  airway exam normal      Mallampati: II   TM distance: > 3 FB   Neck ROM: full   Mouth opening: > 3 cm    Respiratory Devices and Support         Dental  no notable dental history         Cardiovascular   cardiovascular exam normal          Pulmonary   pulmonary exam normal                OUTSIDE LABS:  CBC:   Lab Results   Component Value Date    WBC 14.0 (H) 2022    HGB 13.6 2022    HCT 38.5 2022     2022     BMP:   Lab Results   Component Value Date     2022    POTASSIUM 3.6 2022    CHLORIDE 102 2022    CO2 27 2022    BUN 8 2022    CR 0.74 2022    GLC 79 2022    GLC 92 2022     COAGS: No results found for: PTT, INR,  FIBR  POC:   Lab Results   Component Value Date    HCGS Negative 11/23/2022     HEPATIC:   Lab Results   Component Value Date    ALBUMIN 4.7 11/23/2022    PROTTOTAL 8.3 11/23/2022    ALT 20 11/23/2022    AST 12 11/23/2022    ALKPHOS 59 11/23/2022    BILITOTAL 1.6 (H) 11/23/2022     OTHER:   Lab Results   Component Value Date    JUAN RAMON 10.1 11/23/2022       Anesthesia Plan    ASA Status:  2      Anesthesia Type: General.     - Airway: ETT   Induction: Intravenous, RSI.   Maintenance: Balanced.        Consents    Anesthesia Plan(s) and associated risks, benefits, and realistic alternatives discussed. Questions answered and patient/representative(s) expressed understanding.    - Discussed:     - Discussed with:  Patient         Postoperative Care    Pain management: IV analgesics, Multi-modal analgesia.   PONV prophylaxis: Ondansetron (or other 5HT-3), Dexamethasone or Solumedrol     Comments:                Shiv Maciel MD

## 2022-11-24 NOTE — ANESTHESIA PROCEDURE NOTES
Airway       Patient location during procedure: OR       Procedure Start/Stop Times: 11/24/2022 8:15 AM  Staff -        CRNA: Adela Diaz APRN CRNA       Performed By: CRNA  Consent for Airway        Urgency: elective  Indications and Patient Condition       Indications for airway management: kiki-procedural       Induction type:intravenous       Mask difficulty assessment: 2 - vent by mask + OA or adjuvant +/- NMBA    Final Airway Details       Final airway type: endotracheal airway       Successful airway: ETT - single  Endotracheal Airway Details        ETT size (mm): 7.0       Cuffed: yes       Successful intubation technique: video laryngoscopy       VL Blade Size: Glidescope 3       Grade View of Cords: 1       Adjucts: stylet       Position: Right       Measured from: gums/teeth       Secured at (cm): 20       Bite block used: None    Post intubation assessment        Placement verified by: capnometry, equal breath sounds and chest rise        Number of attempts at approach: 1       Secured with: pink tape       Ease of procedure: easy       Dentition: Intact    Medication(s) Administered   Medication Administration Time: 11/24/2022 8:15 AM

## 2022-11-24 NOTE — PLAN OF CARE
Fall Risk (Y/N): No  Behavior Concerns: None  Pain Management: C/o mild abdominal pain, declined interventions.  Tele/VS/O2: BP soft, all other VSS, on RA.  ABNL Lab/BG: WBC 14.0  Diet: NPO  Bowel/Bladder: Continent  Skin Concerns: None  Drains/Devices: PIV infusing LR at 100/hr.  Tests/Procedures for next shift: Laparoscopic appendectomy at 8am.   Anticipated DC date & active delays: Today post procedure.  Patient Stated Goal for Today: Sleep    PRN Compazine x1 for nausea.  IV Zosyn.  Melatonin for sleep.  Covid recovered.        Observation goals  PRIOR TO DISCHARGE        Comments:   -diagnostic tests and consults completed and resulted- NOT MET   -vital signs normal or at patient baseline- MET  Nurse to notify provider when observation goals have been met and patient is ready for discharge.

## 2022-11-24 NOTE — H&P
General Surgery Consultation    Meliza Lala MRN# [id number]   Age: 21 year old YOB: 2000     Date of Admission:  11/23/2022    Reason for consult:            Acute appendicitis with localized peritonitis, without perforation, abscess, or gangrene [K35.30]       Requesting physician:            Jory Mariano CNP                   Chief Complaint:   Abdominal pain     History is obtained from the patient         History of Present Illness:   This patient is a 21 year old  female who presents with generalized abdominal pain for 3 days which migrated to the RLQ over the past 24-48hrs. She has a history of IBS diagnosed last year after colonoscopy/endoscopy was normal. She has not had prior abdominal surgeries. She is otherwise very healthy. Reports the pain began all over on the abdomen and now is worse in the RLQ. Mild nausea. No emesis. Recently had COVID but no fevers for several days.             Past Medical History:    has a past medical history of Irritable bowel syndrome.          Past Surgical History:     Past Surgical History:   Procedure Laterality Date     MYRINGOTOMY, INSERT TUBE BILATERAL, COMBINED       TONSILLECTOMY & ADENOIDECTOMY               Social History:     Social History     Tobacco Use     Smoking status: Never     Smokeless tobacco: Never   Substance Use Topics     Alcohol use: Yes     Comment: Occasionally             Family History:   Reviewed         Allergies:   No Known Allergies          Medications:   No current facility-administered medications on file prior to encounter.  No current outpatient medications on file prior to encounter.      [Auto Hold] piperacillin-tazobactam  3.375 g Intravenous Q6H     [Auto Hold] sodium chloride (PF)  3 mL Intracatheter Q8H            Review of Systems:   A 10 point Review of Systems is negative other than noted in the HPI.          Physical Exam:   /66   Pulse 74   Temp 97.5  F (36.4  C) (Temporal)   Resp 16    "Ht 1.6 m (5' 3\")   Wt 59 kg (130 lb)   SpO2 98%   BMI 23.03 kg/m    General - Well developed, well nourished female in no apparent distress  Psych: normal affect, pleasant  HEENT:  Head normocephalic and atraumatic, pupils equal and round, conjunctivae clear, no scleral icterus, external ears and nose normal  Neck: Supple without thyromegaly or masses  Lymphatic: No cervical, or supraclavicular lymphadenopathy  Lungs: Clear to auscultation bilaterally  Heart: regular rate and rhythm, no murmurs  Abdomen:   soft, flat, non-distended with moderate tenderness noted in the right lower quadrant. Focal rebound and guarding. no masses palpated.  Extremities: Warm without edema  Neurologic: nonfocal  Psychiatric: Mood and affect appropriate  Skin: Without lesions or rashes, or juandice         Data:     Lab Results   Component Value Date    WBC 14.0 11/23/2022     Lab Results   Component Value Date    HGB 13.6 11/23/2022     Lab Results   Component Value Date     11/23/2022     Last Basic Metabolic Panel:  Lab Results   Component Value Date     11/23/2022      Lab Results   Component Value Date    POTASSIUM 3.6 11/23/2022     Lab Results   Component Value Date    CHLORIDE 102 11/23/2022     Lab Results   Component Value Date    JUAN RAMON 10.1 11/23/2022     Lab Results   Component Value Date    CO2 27 11/23/2022     Lab Results   Component Value Date    BUN 8 11/23/2022     Lab Results   Component Value Date    CR 0.74 11/23/2022     Lab Results   Component Value Date    GLC 79 11/24/2022    GLC 92 11/23/2022       Liver Function Studies -   Recent Labs   Lab Test 11/23/22  1437   PROTTOTAL 8.3   ALBUMIN 4.7   BILITOTAL 1.6*   ALKPHOS 59   AST 12   ALT 20       All labs and imaging was personally reviewed.     Imaging:    Results for orders placed or performed during the hospital encounter of 11/23/22   CT Abdomen Pelvis w Contrast    Narrative    EXAM: CT ABDOMEN PELVIS W CONTRAST  LOCATION: [facility]  DATE/TIME: 11/23/2022 5:26 PM    INDICATION: RLQ pain with diarrhea.  COMPARISON: None.  TECHNIQUE: CT scan of the abdomen and pelvis was performed following injection of IV contrast. Multiplanar reformats were obtained. Dose reduction techniques were used.  CONTRAST: 65 mL Isovue-370 IV.    FINDINGS:   LOWER CHEST: Normal.    HEPATOBILIARY: Normal.    PANCREAS: Normal.    SPLEEN: Normal.    ADRENAL GLANDS: Normal.    KIDNEYS/BLADDER: Normal.    BOWEL: Severe acute appendicitis evidenced by significant enlargement and inflammatory change of the appendix extending medial to the cecum. The transverse size of the appendix measures up to 2.3 cm on series 2, image 141. Calcified appendicolith noted.   There is adjacent edema. No drainable abscess can be seen at this time. There are several enlarged lymph nodes leading to the right lower quadrant that are likely reactive. No small bowel obstruction. No free air.    LYMPH NODES: Enlarged lymph nodes at the right lower quadrant mesentery likely related to appendicitis.    VASCULATURE: Unremarkable.    PELVIC ORGANS: Normal.    MUSCULOSKELETAL: Normal.      Impression    IMPRESSION: Severe acute appendicitis. Prominent enlargement of the appendix measuring 2.3 cm with inflammatory edema noted. No abscess at this time.             Assessment and Plan:   Assessment:   Meliza Lala is a 21 year old with acute appendicitis.       Plan:   I discussed the pathophysiology of appendicitis and the need for surgical intervention. I have also discussed the risks, benefits, complications including but not limited to bleeding, infection, possible injury to the bowel or ureter, possible anastomotic dehiscence, possible post operative abscess, possible postoperative MI, PE, or other anesthetic complications. If one of these complications did arise the patient could require further surgery. The patient thoroughly understood the conversation and will sign consent.      Kendra Hadley MD

## 2022-11-24 NOTE — PLAN OF CARE
Pt will be discharging home from PACU when meds are ready from [facility]. PACU Nurse aware. AVS and education given in the presence of three family members Questions answered. Pt's Family will transport home.       Plan of Care Reviewed With: patient

## 2022-11-24 NOTE — DISCHARGE INSTRUCTIONS
Same Day Surgery Discharge Instructions for  Sedation and General Anesthesia     It's not unusual to feel dizzy, light-headed or faint for up to 24 hours after surgery or while taking pain medication.  If you have these symptoms: sit for a few minutes before standing and have someone assist you when you get up to walk or use the bathroom.    You should rest and relax for the next 24 hours. We recommend you make arrangements to have an adult stay with you for at least 24 hours after your discharge.  Avoid hazardous and strenuous activity.    DO NOT DRIVE any vehicle or operate mechanical equipment for 24 hours following the end of your surgery.  Even though you may feel normal, your reactions may be affected by the medication you have received.    Do not drink alcoholic beverages for 24 hours following surgery.     Slowly progress to your regular diet as you feel able. It's not unusual to feel nauseated and/or vomit after receiving anesthesia.  If you develop these symptoms, drink clear liquids (apple juice, ginger ale, broth, 7-up, etc. ) until you feel better.  If your nausea and vomiting persists for 24 hours, please notify your surgeon.      All narcotic pain medications, along with inactivity and anesthesia, can cause constipation. Drinking plenty of liquids and increasing fiber intake will help.    For any questions of a medical nature, call your surgeon.    Do not make important decisions for 24 hours.    If you had general anesthesia, you may have a sore throat for a couple of days related to the breathing tube used during surgery.  You may use Cepacol lozenges to help with this discomfort.  If it worsens or if you develop a fever, contact your surgeon.     If you feel your pain is not well managed with the pain medications prescribed by your surgeon, please contact your surgeon's office to let them know so they can address your concerns.       Today you received Toradol, an antiinflammatory medication  similar to Ibuprofen.  You should not take other antiinflammatory medication, such as Ibuprofen, Motrin, Advil, Aleve, Naprosyn, etc until 3:00 PM.       [facility]   Discharge Instructions: Post-Operative Laparoscopic Appendectomy   ACTIVITY   Expect to feel tired after your surgery. This will gradually resolve.   Take frequent, short walks and increase your activity gradually.   Avoid strenuous physical activity or heavy lifting greater than 15-20 lbs. for 2-3 weeks. You may climb stairs.   You may drive without restrictions when you are not using any prescription pain medication and feel comfortable in a car.   You may return to work/school when you are comfortable without any prescription pain medication.     WOUND CARE   You may remove your outer dressing or Band-Aids and shower 48 hours after the surgery. Pat your incisions dry and leave them open to air. Re-apply dressing (Band-Aids or gauze/tape) as needed for comfort or drainage.   You may have steri-strips (looks like white tape) on your incision. You may peel off the steri-strips 2 weeks after your surgery if they have not peeled off on their own.   Do not soak your incisions in a tub or pool for 2 weeks.   Do not apply any lotions, creams, or ointments to your incisions.   A ridge under your incisions is normal and will gradually resolve.     DIET   Start with liquids, then gradually resume your regular diet as tolerated. Avoid heavy, spicy, and greasy meals for 2-3 days.   Drink plenty of fluids to stay hydrated.     PAIN   Expect some tenderness and discomfort at the incision sites. Use the prescribed pain medication at your discretion. Expect gradual resolution of your pain over several days.   You may take ibuprofen with food (unless you have been told not to) or acetaminophen/Tylenol instead of or in addition to your prescribed pain medication. If you are taking Norco or Percocet, do not take any additional  acetaminophen/Tylenol.   Do not drink alcohol or drive while you are taking pain medications.   You may apply ice to your incisions in 20 minute intervals as needed for the next 48 hours. After that time, consider switching to heat if you prefer.     EXPECTATIONS   Pain medications can cause constipation. Limit use when possible. Take over the counter stool softener/stimulant, such as Colace or Senna, 1-2 times a day with plenty of water. You may take a mild over the counter laxative, such as Miralax or a suppository, as needed. You make discontinue these medications once you are having regular bowel movements and/or are no longer taking your narcotic pain medication.   You may have shoulder or upper back discomfort due to the gas used in surgery. This is temporary and should resolve in 48-72 hours. Short, frequent walks may help with this.   If you are unable to urinate, or feel as though you are not emptying your bladder adequately, we recommend you call our office and/or seek care at an ER or Urgent Care facility if after hours.     FOLLOW UP   Our office will contact you in approximately 2 weeks to check on your progress and answer any questions you may have. If you are doing well, you will not need to return for a follow up appointment.? If any concerns are identified over the phone, we will help you make an appointment to see a provider.   If you have not received a phone call, have any questions or concerns, or would like to be seen, please call us at [phone] and ask to speak with our nurse. We are located at [address] [place] [address].     CALL OUR OFFICE -[phone] IF YOU HAVE:   Chills or fever above 101 F.   Increased redness, warmth, or drainage at your incisions.   Significant bleeding.   Pain not relieved by your pain medication or rest.   Increasing pain after the first 48 hours.   Any other concerns or questions. Revised September 2020        **If you have concerns  or questions about your procedure,    please contact Dr Hadley at  [phone]**

## 2022-11-24 NOTE — PROGRESS NOTES
Observation goals  PRIOR TO DISCHARGE        Comments:   -diagnostic tests and consults completed and resulted- NOT MET   -vital signs normal or at patient baseline- MET  Nurse to notify provider when observation goals have been met and patient is ready for discharge.

## 2022-11-24 NOTE — ANESTHESIA POSTPROCEDURE EVALUATION
Patient: Meliza Lala    Procedure: Procedure(s):  APPENDECTOMY, LAPAROSCOPIC       Anesthesia Type:  General    Note:  Disposition: Inpatient   Postop Pain Control: Uneventful            Sign Out: Well controlled pain   PONV: No   Neuro/Psych: Uneventful            Sign Out: Acceptable/Baseline neuro status   Airway/Respiratory: Uneventful            Sign Out: Acceptable/Baseline resp. status   CV/Hemodynamics: Uneventful            Sign Out: Acceptable CV status; No obvious hypovolemia; No obvious fluid overload   Other NRE: NONE   DID A NON-ROUTINE EVENT OCCUR? No           Last vitals:  Vitals Value Taken Time   /80 11/24/22 1000   Temp 36.4  C (97.6  F) 11/24/22 0950   Pulse 80 11/24/22 1000   Resp 10 11/24/22 0954   SpO2 99 % 11/24/22 1000   Vitals shown include unvalidated device data.    Electronically Signed By: Shiv Maciel MD  November 24, 2022  10:17 AM

## 2022-11-28 LAB
PATH REPORT.COMMENTS IMP SPEC: NORMAL
PATH REPORT.COMMENTS IMP SPEC: NORMAL
PATH REPORT.FINAL DX SPEC: NORMAL
PATH REPORT.GROSS SPEC: NORMAL
PATH REPORT.MICROSCOPIC SPEC OTHER STN: NORMAL
PATH REPORT.RELEVANT HX SPEC: NORMAL
PHOTO IMAGE: NORMAL

## 2022-12-14 ENCOUNTER — TELEPHONE (OUTPATIENT)
Dept: SURGERY | Facility: CLINIC | Age: 22
End: 2022-12-14

## 2022-12-14 NOTE — TELEPHONE ENCOUNTER
[facility]  Post op call note - Laparoscopic appendectomy    Meliza Lala was called for an update regarding her recovery.  She underwent a laparoscopic appendectomy by Dr. Hadley on 11/24/22.     Today she tells me she is doing well and denies any complaints.  She is eating a normal diet and her bowels are regular.  She states her wounds are healing well.  The patient has no complaints about fever/chills, n/v/d, abdominal pain, changes in urination or BM, or wound issues.     Pathology:  Final Diagnosis   Appendix, appendectomy:   -Acute appendicitis   This was discussed with the patient.      The patient has no questions were answered and she understands our discussion.  She agrees to follow up as needed and to call our office with any concerns.    Mario Anderson PA-C  [phone]

## (undated) DEVICE — SOL NACL 0.9% INJ 1000ML BAG 2B1324X

## (undated) DEVICE — ENDO TROCAR SLEEVE KII Z-THREADED 05X100MM CTS02

## (undated) DEVICE — SU VICRYL 0 UR-6 27" J603H

## (undated) DEVICE — PREP CHLORAPREP 26ML TINTED HI-LITE ORANGE 930815

## (undated) DEVICE — ESU ENDO SCISSORS 5MM CVD 5DCS

## (undated) DEVICE — SU MONOCRYL 4-0 PS-2 18" UND Y496G

## (undated) DEVICE — SYSTEM LAPAROVUE VISIBILITY LAPVUE10

## (undated) DEVICE — SOL WATER IRRIG 1000ML BOTTLE 2F7114

## (undated) DEVICE — STPL RELOAD REG TISSUE ECHELON 45 X 3.6MM BLUE GST45B

## (undated) DEVICE — ENDO SCOPE WARMER LF TM500

## (undated) DEVICE — ENDO TROCAR FIRST ENTRY KII FIOS Z-THRD 05X100MM CTF03

## (undated) DEVICE — GLOVE BIOGEL PI MICRO INDICATOR UNDERGLOVE SZ 6.5 48965

## (undated) DEVICE — LINEN TOWEL PACK X5 5464

## (undated) DEVICE — CLIP APPLIER ENDO ROTATING 10MM MED/LG ER320

## (undated) DEVICE — ENDO TROCAR FIRST ENTRY KII FIOS Z-THRD 12X100MM CTF73

## (undated) DEVICE — DEVICE SUTURE GRASPER TROCAR CLOSURE 14GA PMITCSG

## (undated) DEVICE — SUCTION CANISTER MEDIVAC LINER 3000ML W/LID 65651-530

## (undated) DEVICE — GLOVE BIOGEL PI MICRO SZ 6.0 48560

## (undated) DEVICE — ESU HOLDER LAP INST DISP PURPLE LONG 330MM H-PRO-330

## (undated) DEVICE — STPL POWERED ECHELON 45MM PSEE45A

## (undated) DEVICE — SUCTION IRR STRYKERFLOW II W/TIP 250-070-520

## (undated) DEVICE — ESU GROUND PAD UNIVERSAL W/O CORD

## (undated) DEVICE — PACK LAP CHOLE SLC15LCFSD

## (undated) DEVICE — ESU LIGASURE MARYLAND LAPAROSCOPIC SLR/DVDR 5MMX37CM LF1937

## (undated) RX ORDER — FENTANYL CITRATE 50 UG/ML
INJECTION, SOLUTION INTRAMUSCULAR; INTRAVENOUS
Status: DISPENSED
Start: 2022-11-24

## (undated) RX ORDER — PROPOFOL 10 MG/ML
INJECTION, EMULSION INTRAVENOUS
Status: DISPENSED
Start: 2022-11-24

## (undated) RX ORDER — ONDANSETRON 2 MG/ML
INJECTION INTRAMUSCULAR; INTRAVENOUS
Status: DISPENSED
Start: 2022-11-24

## (undated) RX ORDER — OXYCODONE HYDROCHLORIDE 5 MG/1
TABLET ORAL
Status: DISPENSED
Start: 2022-11-24

## (undated) RX ORDER — LIDOCAINE HYDROCHLORIDE 10 MG/ML
INJECTION, SOLUTION INFILTRATION; PERINEURAL
Status: DISPENSED
Start: 2022-11-24

## (undated) RX ORDER — DEXAMETHASONE SODIUM PHOSPHATE 4 MG/ML
INJECTION, SOLUTION INTRA-ARTICULAR; INTRALESIONAL; INTRAMUSCULAR; INTRAVENOUS; SOFT TISSUE
Status: DISPENSED
Start: 2022-11-24

## (undated) RX ORDER — BUPIVACAINE HYDROCHLORIDE AND EPINEPHRINE 5; 5 MG/ML; UG/ML
INJECTION, SOLUTION EPIDURAL; INTRACAUDAL; PERINEURAL
Status: DISPENSED
Start: 2022-11-24